# Patient Record
Sex: MALE | Race: WHITE
[De-identification: names, ages, dates, MRNs, and addresses within clinical notes are randomized per-mention and may not be internally consistent; named-entity substitution may affect disease eponyms.]

---

## 2018-04-11 ENCOUNTER — HOSPITAL ENCOUNTER (OUTPATIENT)
Dept: HOSPITAL 45 - C.MRIBC | Age: 18
Discharge: HOME | End: 2018-04-11
Attending: FAMILY MEDICINE
Payer: COMMERCIAL
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
## 2018-04-11 DIAGNOSIS — M25.312: Primary | ICD-10-CM

## 2018-04-11 DIAGNOSIS — S43.439A: ICD-10-CM

## 2018-04-11 NOTE — DIAGNOSTIC IMAGING REPORT
FLUOROSCOPIC GUIDED LEFT SHOULDER ARTHROGRAM



FLUOROSCOPY TIME: 3 seconds.



HISTORY: Left shoulder pain..



PROCEDURE: After obtaining written informed consent, the patient was placed

supine on the fluoroscopy table. A suitable site for needle insertion was marked

using fluoroscopic guidance. The left shoulder was prepped and draped in the

usual sterile fashion. 1% lidocaine was used for skin, subcutaneous and deep

soft tissue anesthesia. Under intermittent fluoroscopic guidance, a 22 gauge 2.5

inch spinal needle was inserted into the left glenohumeral joint. A total of 14

cc of one-to-one mixture of dilute Gadavist and Optiray 300 were injected. The

needle was then removed. There were no apparent complications. The patient was

transported to MR for further imaging.



IMPRESSION: Fluoroscopic-guided left shoulder arthrogram without immediate

complication. Total injected volume was 14 cc. MR portion of the examination

will be dictated separately.







Electronically signed by:  Song Odonnell M.D.

4/11/2018 2:02 PM



Dictated Date/Time:  4/11/2018 2:02 PM

## 2018-04-11 NOTE — DIAGNOSTIC IMAGING REPORT
L UPPER EXT JOINT WITH



CLINICAL HISTORY: 17 years-old Male with LABRAL TEAR OF L SHOULDER.  Acute left

shoulder pain with history of vascular injury with forced abduction. Concern for

acute labral tear.



COMPARISON: None.



TECHNIQUE: Multiplanar, multi sequence MRI of the left shoulder was performed

following the intra-articular administration of solution containing gadolinium 



FINDINGS: 



ROTATOR CUFF:  The tendons of the rotator cuff including the supraspinatus,

infraspinatus, teres minor and subcapularis are intact. The rotator cuff

musculature is normal in morphology and signal.  



BICEPS TENDON:  The long-head biceps tendon is intact.  No evidence of

tendinosis.  The biceps pulley and anchor are intact.



LABRUM:  There is mild irregularity of the anteroinferior portions of the labrum

nicely seen on images 9, 10 and 11 of series 5 with linear extension of contrast

compatible with tear. There is no evidence for a paralabral cyst. There is

suggestion of a small displaced labral fragment adjacent to the anteroinferior

labrum, 3 mm.



GLENOHUMERAL JOINT:  The articular cartilage overlying the glenoid fossa is

unremarkable. There is no loose body or debris present within the glenohumeral

joint.



ACROMIOCLAVICULAR JOINT:  The AC joint is intact without significant

degenerative change or mass effect.  No evidence of os acromiale.  No

subacromial/subdeltoid bursitis.



OUTLET SPACES:  The suprascapular notch and quadrilateral space are without

obstructing or space occupying lesions.



BONE MARROW:  No focal abnormality, fracture or marrow occupying lesion.



SOFT TISSUES:  The periarticular soft tissues are unremarkable.



IMPRESSION:  

1. Tear of the anteroinferior labrum.

2. Long head biceps tendon and rotator cuff appear unremarkable.

3. No acute fracture or focal bone marrow edema.







The above report was generated using voice recognition software. It may contain

grammatical, syntax or spelling errors.









Electronically signed by:  Ryne Jacob M.D.

4/11/2018 2:59 PM



Dictated Date/Time:  4/11/2018 2:39 PM

## 2018-04-13 ENCOUNTER — HOSPITAL ENCOUNTER (EMERGENCY)
Dept: HOSPITAL 45 - C.EDB | Age: 18
Discharge: HOME | End: 2018-04-13
Payer: COMMERCIAL

## 2018-04-13 VITALS — OXYGEN SATURATION: 97 %

## 2018-04-13 VITALS
WEIGHT: 180.78 LBS | WEIGHT: 180.78 LBS | HEIGHT: 70 IN | BODY MASS INDEX: 25.88 KG/M2 | BODY MASS INDEX: 25.88 KG/M2 | HEIGHT: 70 IN

## 2018-04-13 VITALS — SYSTOLIC BLOOD PRESSURE: 124 MMHG | DIASTOLIC BLOOD PRESSURE: 61 MMHG | OXYGEN SATURATION: 98 % | HEART RATE: 66 BPM

## 2018-04-13 VITALS — TEMPERATURE: 98.24 F

## 2018-04-13 DIAGNOSIS — Z91.018: ICD-10-CM

## 2018-04-13 DIAGNOSIS — Z91.013: ICD-10-CM

## 2018-04-13 DIAGNOSIS — Z88.0: ICD-10-CM

## 2018-04-13 DIAGNOSIS — T78.40XA: Primary | ICD-10-CM

## 2018-04-13 DIAGNOSIS — Z91.010: ICD-10-CM
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
## 2018-04-13 NOTE — EMERGENCY ROOM VISIT NOTE
History


Report prepared by Bre:  Fritz Jenkins


Under the Supervision of:  Dr. Choco Lanza M.D.


First contact with patient:  20:52


Chief Complaint:  ALLERGIC REACTION


Stated Complaint:  ALLERGIC REACTION


Nursing Triage Summary:  


Patient presents to [facility] via BLS from [facility], states "I was eating a bowl of 


food that I have eaten plenty of times before. I started feeling like my throat 


was closing so I used my epipen. 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 


history of EOE which I occassionally take Prilosec for when I get a flare up."





No hives or rash noted. Patient in no respiratory distress upon arrival.





History of Present Illness


The patient is a 17 year old white male with a past medical history of 

esophagitis, sesame seed, seafood, and nut allergy, tonsillectomy, and meniscus 

surgery who presents to the ED with a cc of a persistent allergic reaction 

beginning an hour ago. The patient states he went to [employer] and had a [place] 

Blast. He reports he then went to [facility] and ate food he normally eats. The 

patient states that following eating he started to develop and allergic 

reaction. He notes it felt as if his throat was closing and he became short of 

breath. The patient states he used his EpiPen at 2015 and states he is no 

longer short of breath. He reports his throat does not feel as uncomfortable as 

previously. Positive resolved SOB, alcohol use. Negative tobacco or drug use, 

nausea, vomiting.





   Source of History:  patient


   Onset:  an hour ago


   Position:  other (global)


   Symptom Intensity:  6/10


   Timing:  other (persistent)


   Modifying Factors (Relieving):  other (Epipen)


   Associated Symptoms:  + SOB, No nausea, No vomiting





Review of Systems


See HPI for pertinent positives and negatives.  A total of ten systems were 

reviewed and were otherwise negative.





Past Medical & Surgical


Medical Problems:


(1) Esophagitis


(2) Nut allergy


(3) Tear meniscus knee


Surgical Problems:


(1) Hx of tonsillectomy








Family History





Patient reports no known family medical history.





Social History


Smoking Status:  Never Smoker


Smokeless Tobacco Use:  No


Alcohol Use:  occasionally


Drug Use:  none


Marital Status:  single


Housing Status:  lives with roommate


Occupation Status:  [employer] student





Current/Historical Medications


Scheduled


Prednisone (Prednisone), 50 MG PO DAILY





Scheduled PRN


Epinephrine (Epipen), 0.3 MG IM UD PRN for Allergic Reaction





Allergies


Coded Allergies:  


     Nut Tree (Verified  Allergy, Severe, ANAPHYLAXIS, 4/13/18)


     Peanut (Verified  Allergy, Severe, ANAPHYLAXIS, 4/13/18)


     Penicillins (Verified  Allergy, Severe, ANAPHYLAXIS, 4/13/18)


     Sesame Seed (Verified  Allergy, Severe, ANAPHYLAXIS, 4/13/18)


Uncoded Allergies:  


     SEAFOOD (Allergy, Severe, ANAPHYLAXIS, 4/13/18)





Physical Exam


Vital Signs











  Date Time  Temp Pulse Resp B/P (MAP) Pulse Ox O2 Delivery O2 Flow Rate FiO2


 


4/13/18 22:45  66 13 124/61 98   


 


4/13/18 22:12  78 18 124/61 98 Room Air  


 


4/13/18 21:24     97 Room Air  


 


4/13/18 21:23  85      


 


4/13/18 20:41     97 Room Air  


 


4/13/18 20:41 36.8 93 18 147/77 97 Room Air  











Physical Exam


GENERAL: Awake, alert, well-appearing, NAD


HENT: Normocephalic, atraumatic. Posterior pharynx clear.


EYES: Normal conjunctiva. Sclera non-icteric.


NECK: Supple. No nuchal rigidity. FROM. No stridor over anterior neck.


RESPIRATORY: CTAB, no rhonchi, wheezing, crackles


CARDIAC: RRR, no MRG


ABDOMEN: Soft, NTND, BS+


MSK: No chest wall TTP, no LE edema


NEURO: GCS 15, CN 2-12 intact, moves all 4s on command


SKIN: No rash or jaundice noted. No urticaria.





Medical Decision & Procedures


Medications Administered











 Medications


  (Trade)  Dose


 Ordered  Sig/Anthony


 Route  Start Time


 Stop Time Status Last Admin


Dose Admin


 


 Ondansetron HCl


  (Zofran Inj)  4 mg  NOW  STAT


 IV  4/13/18 21:07


 4/13/18 21:09 DC 4/13/18 21:20


4 MG


 


 Methylprednisolone


 Sodium Succinate


  (Solu-Medrol IV)  125 mg  NOW  STAT


 IV  4/13/18 21:07


 4/13/18 21:09 DC 4/13/18 21:20


125 MG


 


 Famotidine


  (Pepcid Tab)  40 mg  NOW  ONCE


 PO  4/13/18 21:15


 4/13/18 21:16 DC 4/13/18 21:20


40 MG


 


 Diphenhydramine


 HCl


  (Benadryl Cap)  25 mg  NOW  ONCE


 PO  4/13/18 21:15


 4/13/18 21:16 DC 4/13/18 21:20


25 MG











ED Course


2102: The patient was evaluated in room B07. A complete history and physical 

exam was performed.





2211: I reevaluated the patient. Discussed results and discharge instructions: 

He verbalized understanding and agreement. The patient is ready for discharge.





Medical Decision


Nursing notes reviewed. Ancillary studies and prior records reviewed. 





The patient is a 17 year old white male with a past medical history of 

esophagitis, sesame seed, seafood, and nut allergy, tonsillectomy, and meniscus 

surgery who presents to the ED with a cc of a persistent allergic reaction 

beginning an hour ago





The patient's presentation and history were concerning for etiologies such as 

allergic reaction, anaphylaxis, urticaria, Plascencia-Carlos syndrome, toxic 

epidermal necrolysis, erythema multiforme, cellulitis, as well as others were 

entertained.





Patient was seen and evaluated the bedside.  Patient reportedly had drank a 

bottle high blast had some simple leg and noticed that he felt like his throat 

was closing so he self administered an EpiPen.  Patient currently is otherwise 

asymptomatic.  Patient has no wheezing no stridor the posterior pharynx is 

clear and the patient has no GI upset, nausea, vomiting, or abdominal pain.  

Patient also has no noted urticaria.  Patient does not believe he consumed any 

sesame seeds, peanuts, or seafood however there may have been some cross 

contamination.





Patient was treated with steroids Pepcid, and Benadryl.  





The patient was reassessed approximately 2 hours prior to his initial symptoms.

  The patient is not stridulous, clear to auscultation bilaterally and without 

any urticaria.  Patient was given prescriptions for prednisone as well as an 

EpiPen for home.  Patient was deemed suitable for outpatient follow-up and 

treatment at this time.  Patient was given strict follow-up, discharge, and 

return precautions.  All questions were answered.  Patient was deemed suitable 

for outpatient follow-up at this time.  Patient agreed with the plan of care 

and was safely discharged home.





Medication Reconcilliation


Current Medication List:  was personally reviewed by me





Blood Pressure Screening


Patient's blood pressure:  Elevated blood pressure


Blood pressure disposition:  Elevated BP felt to be situational





Impression





 Primary Impression:  


 Allergic reaction





Scribe Attestation


The scribe's documentation has been prepared under my direction and personally 

reviewed by me in its entirety. I confirm that the note above accurately 

reflects all work, treatment, procedures, and medical decision making performed 

by me.





Departure Information


Dispostion


Home / Self-Care





Prescriptions





Prednisone (PREDNISONE) 50 Mg Tab


50 MG PO DAILY for 4 Days, #4 TAB


   Prov: Choco Lanza M.D.         4/13/18 


Epinephrine (EPIPEN) 0.3 Mg/0.3 Ml Inj


0.3 MG IM UD Y for Allergic Reaction, #1 PEN


   Prov: Choco Lanza M.D.         4/13/18





Referrals


No Doctor, Assigned (PCP)





Patient Instructions


ED Allergic React Food, My [facility] [employer]





Additional Instructions





Please return to the emergency department if you have worsening or recurrent 

symptoms not amenable to at-home treatment.  Please call for a follow-up 

appointment with her primary care physician.  Please take your medications as 

prescribed.  If you have other concerns and/or complaints please feel free to 

also call your primary care physician's office or return the ED for further 

evaluation, management, and treatment.





You may take 600 mg Ibuprofen every 6 hours as needed for pain with food for no 

more than 2 consecutive days. You may take tylenol 1000 mg every 6 hours as 

needed for pain. You may take motrin and tylenol separately or at the same 

time. 





Take your medications as prescribed.





You have been examined and treated today on an emergency basis only. This is 

not a substitute for, or an effort to provide, complete comprehensive medical 

care. It is impossible to recognize and treat all injuries or illnesses in a 

single emergency department visit. It is therefore important that you follow up 

closely with [facility], your PCP, and/or your specialist(s). 

Call as soon as possible for an appointment.





Thank you for your time and consideration. I look forward to speaking with you 

again soon. Please don't hesitate to call us if you have any questions.





Problem Qualifiers








 Primary Impression:  


 Allergic reaction


 Encounter type:  initial encounter  Qualified Codes:  T78.40XA - Allergy, 

unspecified, initial encounter